# Patient Record
Sex: MALE | Race: WHITE | Employment: FULL TIME | URBAN - NONMETROPOLITAN AREA
[De-identification: names, ages, dates, MRNs, and addresses within clinical notes are randomized per-mention and may not be internally consistent; named-entity substitution may affect disease eponyms.]

---

## 2020-10-15 ENCOUNTER — HOSPITAL ENCOUNTER (EMERGENCY)
Age: 45
Discharge: HOME | End: 2020-10-15
Attending: EMERGENCY MEDICINE
Payer: COMMERCIAL

## 2020-10-15 VITALS
HEIGHT: 72 IN | RESPIRATION RATE: 18 BRPM | DIASTOLIC BLOOD PRESSURE: 90 MMHG | HEART RATE: 88 BPM | TEMPERATURE: 97.8 F | SYSTOLIC BLOOD PRESSURE: 151 MMHG | BODY MASS INDEX: 35.89 KG/M2 | OXYGEN SATURATION: 100 % | WEIGHT: 265 LBS

## 2020-10-15 DIAGNOSIS — S91.311A LACERATION OF DORSUM OF RIGHT FOOT: Primary | ICD-10-CM

## 2020-10-15 PROCEDURE — 99283 EMERGENCY DEPT VISIT LOW MDM: CPT

## 2020-10-15 PROCEDURE — 75810000293 HC SIMP/SUPERF WND  RPR

## 2020-10-15 RX ORDER — IBUPROFEN 800 MG/1
800 TABLET ORAL
Qty: 20 TAB | Refills: 0 | Status: SHIPPED | OUTPATIENT
Start: 2020-10-15 | End: 2020-10-22
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
## 2020-10-15 NOTE — DISCHARGE INSTRUCTIONS
Patient Education        Cuts Closed With Adhesives: Care Instructions  Your Care Instructions  A cut can happen anywhere on your body. The doctor used an adhesive to close the cut. When the adhesive dries, it forms a film that holds the edges of the cut together. Skin adhesives are sometimes called liquid stitches. If the cut went deep and through the skin, the doctor may have put in a layer of stitches below the adhesive. The deeper layer of stitches brings the deep part of the cut together. These stitches will dissolve and don't need to be removed. You don't see the stitches, only the adhesive. You may have a bandage. The doctor has checked you carefully, but problems can develop later. If you notice any problems or new symptoms, get medical treatment right away. Follow-up care is a key part of your treatment and safety. Be sure to make and go to all appointments, and call your doctor if you are having problems. It's also a good idea to know your test results and keep a list of the medicines you take. How can you care for yourself at home? · Keep the cut dry for the first 24 to 48 hours. After this, you can shower if your doctor okays it. Pat the cut dry. · Don't soak the cut, such as in a bathtub. Your doctor will tell you when it's safe to get the cut wet. · If your doctor told you how to care for your cut, follow your doctor's instructions. If you did not get instructions, follow this general advice:  ? Do not put any kind of ointment, cream, or lotion over the area. This can make the adhesive fall off too soon. ? After the first 24 to 48 hours, wash around the cut with clean water 2 times a day. Do not use hydrogen peroxide or alcohol, which can slow healing. ? If the doctor told you to use a bandage, put on a new bandage after cleaning the cut or if the bandage gets wet or dirty. · Prop up the sore area on a pillow anytime you sit or lie down during the next 3 days.  Try to keep it above the level of your heart. This will help reduce swelling. · Leave the skin adhesive on your skin until it falls off on its own. This may take 5 to 10 days. · Do not scratch, rub, or pick at the adhesive. · Do not put the sticky part of a bandage directly on the adhesive. · Avoid any activity that could cause your cut to reopen. · Be safe with medicines. Read and follow all instructions on the label. ? If the doctor gave you a prescription medicine for pain, take it as prescribed. ? If you are not taking a prescription pain medicine, ask your doctor if you can take an over-the-counter medicine. When should you call for help? Call your doctor now or seek immediate medical care if:    · You have new pain, or your pain gets worse.     · The skin near the cut is cold or pale or changes color.     · You have tingling, weakness, or numbness near the cut.     · The cut starts to bleed.     · You have trouble moving the area near the cut.     · You have symptoms of infection, such as:  ? Increased pain, swelling, warmth, or redness around the cut.  ? Red streaks leading from the cut.  ? Pus draining from the cut.  ? A fever. Watch closely for changes in your health, and be sure to contact your doctor if:    · The cut reopens.     · You do not get better as expected. Where can you learn more? Go to http://www.gray.com/  Enter P174 in the search box to learn more about \"Cuts Closed With Adhesives: Care Instructions. \"  Current as of: June 26, 2019               Content Version: 12.6  © 8665-4069 [employer]. Care instructions adapted under license by [employer] (which disclaims liability or warranty for this information). If you have questions about a medical condition or this instruction, always ask your healthcare professional. [address] any warranty or liability for your use of this information.
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
## 2023-06-22 ENCOUNTER — OFFICE VISIT (OUTPATIENT)
Age: 48
End: 2023-06-22

## 2023-06-22 VITALS — WEIGHT: 275 LBS | HEIGHT: 72 IN | BODY MASS INDEX: 37.25 KG/M2

## 2023-06-22 DIAGNOSIS — M25.561 PAIN IN BOTH KNEES, UNSPECIFIED CHRONICITY: Primary | ICD-10-CM

## 2023-06-22 DIAGNOSIS — M17.0 OSTEOARTHRITIS OF BOTH KNEES, UNSPECIFIED OSTEOARTHRITIS TYPE: ICD-10-CM

## 2023-06-22 DIAGNOSIS — M25.562 PAIN IN BOTH KNEES, UNSPECIFIED CHRONICITY: Primary | ICD-10-CM

## 2023-06-22 RX ORDER — LIDOCAINE HYDROCHLORIDE 10 MG/ML
9 INJECTION, SOLUTION INFILTRATION; PERINEURAL ONCE
Status: COMPLETED | OUTPATIENT
Start: 2023-06-22 | End: 2023-06-22

## 2023-06-22 RX ORDER — ATORVASTATIN CALCIUM 20 MG/1
20 TABLET, FILM COATED ORAL
COMMUNITY
Start: 2022-12-21

## 2023-06-22 RX ORDER — LISINOPRIL 2.5 MG/1
2.5 TABLET ORAL DAILY
COMMUNITY
Start: 2023-01-18

## 2023-06-22 RX ORDER — TRIAMCINOLONE ACETONIDE 40 MG/ML
40 INJECTION, SUSPENSION INTRA-ARTICULAR; INTRAMUSCULAR ONCE
Status: COMPLETED | OUTPATIENT
Start: 2023-06-22 | End: 2023-06-22

## 2023-06-22 RX ADMIN — LIDOCAINE HYDROCHLORIDE 9 ML: 10 INJECTION, SOLUTION INFILTRATION; PERINEURAL at 15:07

## 2023-06-22 RX ADMIN — LIDOCAINE HYDROCHLORIDE 9 ML: 10 INJECTION, SOLUTION INFILTRATION; PERINEURAL at 15:06

## 2023-06-22 RX ADMIN — TRIAMCINOLONE ACETONIDE 40 MG: 40 INJECTION, SUSPENSION INTRA-ARTICULAR; INTRAMUSCULAR at 15:07

## 2023-06-22 NOTE — PROGRESS NOTES
We will go from there. As part of continued conservative pain management options the patient was advised to utilize Tylenol or OTC NSAIDS as long as it is not medically contraindicated. Return to Office: Follow-up and Dispositions    Return if symptoms worsen or fail to improve. Scribed by Caleb Garcia LPN as dictated by RECOVERY [facility] RESPONSE McKinnon SVEN Galeana MD.  Documentation, performed by, True and Accepted Jeremie SVEN Galeana MD

## 2025-06-20 ENCOUNTER — TELEPHONE (OUTPATIENT)
Age: 50
End: 2025-06-20

## 2025-06-20 NOTE — TELEPHONE ENCOUNTER
Referral for Melena. Please review and advise.    Referral  Referral # [id number]  Referral Information    Referral # Creation Date Referral Status Status Update    [id number] 06/20/2025 Pending Review 06/20/2025: Status History     Status Reason Referral Type Referral Reasons Referral Class   Pending Physician Review Eval and Treat Specialty Services Required Incoming     To Specialty To Provider To Location/Place of Service To Department   Gastroenterology Elton Isaacs MD none HR [facility] - GASTROENTEROLOGY     To Vendor Referred By By Location/Place of Service By Department   none Marta Hawkins APRN - NP none none     Priority Start Date Expiration Date Referral Entered By   Routine 06/20/2025 06/20/2026 Roberta Lopez MA     Visits Requested Visits Authorized Visits Completed Visits Scheduled   2 2       Coverages    Payer Plan Auth. Required? Covered? Member # Authorized From Expires Auth # Precert. # Comment   DAVIE BRODERICK [facility] -- Covered [id number] 8/1/2024 -- -- -- --     Referral Information    Referral # Creation Date Referral Status Status Update    [id number] 06/20/2025 Pending Review 06/20/2025: Status History     Status Reason Referral Type Referral Reasons Referral Class   Pending Physician Review Eval and Treat Specialty Services Required Incoming     To Specialty To Provider To Location/Place of Service To Department   Gastroenterology Elton Isaacs MD none HR [facility] - GASTROENTEROLOGY     To Vendor Referred By By Location/Place of Service By Department   none Marta Hawkins APRN - NP none none     Priority Start Date Expiration Date Referral Entered By   Routine 06/20/2025 06/20/2026 Roberta Lopez MA     Visits Requested Visits Authorized Visits Completed Visits Scheduled   2 2       Procedure Information    Service Details  Procedure Modifiers Provider Requested Approved   REF25 - AMB REFERRAL TO GASTROENTEROLOGY none Shital

## 2025-07-31 ENCOUNTER — OFFICE VISIT (OUTPATIENT)
Age: 50
End: 2025-07-31

## 2025-07-31 DIAGNOSIS — M17.12 PRIMARY OSTEOARTHRITIS OF LEFT KNEE: ICD-10-CM

## 2025-07-31 DIAGNOSIS — M25.562 PAIN IN BOTH KNEES, UNSPECIFIED CHRONICITY: ICD-10-CM

## 2025-07-31 DIAGNOSIS — M17.11 PRIMARY OSTEOARTHRITIS OF RIGHT KNEE: Primary | ICD-10-CM

## 2025-07-31 DIAGNOSIS — M25.561 PAIN IN BOTH KNEES, UNSPECIFIED CHRONICITY: ICD-10-CM

## 2025-07-31 RX ORDER — LIDOCAINE HYDROCHLORIDE 10 MG/ML
9 INJECTION, SOLUTION INFILTRATION; PERINEURAL ONCE
Status: CANCELLED | OUTPATIENT
Start: 2025-07-31 | End: 2025-07-31

## 2025-07-31 RX ORDER — TRIAMCINOLONE ACETONIDE 40 MG/ML
40 INJECTION, SUSPENSION INTRA-ARTICULAR; INTRAMUSCULAR ONCE
Status: COMPLETED | OUTPATIENT
Start: 2025-07-31 | End: 2025-07-31

## 2025-07-31 RX ORDER — TRIAMCINOLONE ACETONIDE 40 MG/ML
40 INJECTION, SUSPENSION INTRA-ARTICULAR; INTRAMUSCULAR ONCE
Status: CANCELLED | OUTPATIENT
Start: 2025-07-31 | End: 2025-07-31

## 2025-07-31 RX ORDER — LIDOCAINE HYDROCHLORIDE 10 MG/ML
9 INJECTION, SOLUTION INFILTRATION; PERINEURAL ONCE
Status: COMPLETED | OUTPATIENT
Start: 2025-07-31 | End: 2025-07-31

## 2025-07-31 RX ADMIN — LIDOCAINE HYDROCHLORIDE 9 ML: 10 INJECTION, SOLUTION INFILTRATION; PERINEURAL at 10:10

## 2025-07-31 RX ADMIN — TRIAMCINOLONE ACETONIDE 40 MG: 40 INJECTION, SUSPENSION INTRA-ARTICULAR; INTRAMUSCULAR at 10:09

## 2025-07-31 NOTE — PROGRESS NOTES
Name: Mt Mota    : 1975     [facility] ORTHOPAEDICS AND SPORTS MEDICINE  [address], [address]  [facility] [address]  Dept: [phone]  Dept Fax: [phone]   Chief Complaint   Patient presents with    Injections     Bilateral knees     There were no vitals taken for this visit.   No Known Allergies  Current Outpatient Medications   Medication Sig Dispense Refill    atorvastatin (LIPITOR) 20 MG tablet Take 1 tablet by mouth      lisinopril (PRINIVIL;ZESTRIL) 2.5 MG tablet Take 1 tablet by mouth daily       No current facility-administered medications for this visit.       There is no problem list on file for this patient.     Family History   Problem Relation Age of Onset    No Known Problems Mother     No Known Problems Father        History reviewed. No pertinent surgical history.   History reviewed. No pertinent past medical history.     I have reviewed and agree with PFS and ROS and intake form in chart and the record furthermore I have reviewed prior medical record(s) regarding this patients care during this appointment.   Review of Systems:   Patient is a pleasant appearing individual, appropriately dressed, well hydrated, well nourished, who is alert, appropriately oriented for age, and in no acute distress with a normal gait and normal affect who does not appear to be in any significant pain.      Physical Exam:  Left Knee -Decrease range of motion with flexion, Knee arc of greater than 50 degrees, Some crepitation, Grossly neurovascularly intact, Good cap refill, No skin lesion, Moderate swelling, some gross instability, Some quadriceps weakness Kellgren and Raul at least grade 4    Right Knee -Decrease range of motion with flexion, Some crepitation, Grossly neurovascularly intact, Good cap refill, No skin lesion, Moderate swelling, some gross instability, Some quadriceps weaknessKellgren and Raul at least grade 4